# Patient Record
Sex: MALE | Race: BLACK OR AFRICAN AMERICAN | Employment: FULL TIME | URBAN - METROPOLITAN AREA
[De-identification: names, ages, dates, MRNs, and addresses within clinical notes are randomized per-mention and may not be internally consistent; named-entity substitution may affect disease eponyms.]

---

## 2019-08-20 ENCOUNTER — OFFICE VISIT (OUTPATIENT)
Dept: ORTHOPEDIC SURGERY | Age: 44
End: 2019-08-20
Payer: COMMERCIAL

## 2019-08-20 VITALS — BODY MASS INDEX: 41.75 KG/M2 | HEIGHT: 73 IN | WEIGHT: 315 LBS

## 2019-08-20 DIAGNOSIS — M17.11 PRIMARY OSTEOARTHRITIS OF RIGHT KNEE: ICD-10-CM

## 2019-08-20 DIAGNOSIS — M25.561 RIGHT KNEE PAIN, UNSPECIFIED CHRONICITY: Primary | ICD-10-CM

## 2019-08-20 PROCEDURE — G8427 DOCREV CUR MEDS BY ELIG CLIN: HCPCS | Performed by: ORTHOPAEDIC SURGERY

## 2019-08-20 PROCEDURE — 1036F TOBACCO NON-USER: CPT | Performed by: ORTHOPAEDIC SURGERY

## 2019-08-20 PROCEDURE — G8417 CALC BMI ABV UP PARAM F/U: HCPCS | Performed by: ORTHOPAEDIC SURGERY

## 2019-08-20 PROCEDURE — 20610 DRAIN/INJ JOINT/BURSA W/O US: CPT | Performed by: ORTHOPAEDIC SURGERY

## 2019-08-20 PROCEDURE — 99203 OFFICE O/P NEW LOW 30 MIN: CPT | Performed by: ORTHOPAEDIC SURGERY

## 2020-09-01 ENCOUNTER — TELEPHONE (OUTPATIENT)
Dept: ORTHOPEDIC SURGERY | Age: 45
End: 2020-09-01

## 2020-09-01 NOTE — TELEPHONE ENCOUNTER
Called pt. Offered appt for tomorrow for rt knee injury. PT denied & states he will visit ED. All questions answered.

## 2020-09-02 ENCOUNTER — OFFICE VISIT (OUTPATIENT)
Dept: ORTHOPEDIC SURGERY | Age: 45
End: 2020-09-02
Payer: COMMERCIAL

## 2020-09-02 VITALS — HEIGHT: 73 IN | WEIGHT: 315 LBS | BODY MASS INDEX: 41.75 KG/M2

## 2020-09-02 PROCEDURE — G8417 CALC BMI ABV UP PARAM F/U: HCPCS | Performed by: ORTHOPAEDIC SURGERY

## 2020-09-02 PROCEDURE — G8427 DOCREV CUR MEDS BY ELIG CLIN: HCPCS | Performed by: ORTHOPAEDIC SURGERY

## 2020-09-02 PROCEDURE — 1036F TOBACCO NON-USER: CPT | Performed by: ORTHOPAEDIC SURGERY

## 2020-09-02 PROCEDURE — 20610 DRAIN/INJ JOINT/BURSA W/O US: CPT | Performed by: ORTHOPAEDIC SURGERY

## 2020-09-02 PROCEDURE — 99214 OFFICE O/P EST MOD 30 MIN: CPT | Performed by: ORTHOPAEDIC SURGERY

## 2020-09-02 RX ORDER — HYDROCODONE BITARTRATE AND ACETAMINOPHEN 5; 325 MG/1; MG/1
1 TABLET ORAL EVERY 6 HOURS PRN
COMMUNITY
Start: 2020-09-01 | End: 2020-09-03

## 2020-09-02 RX ORDER — METHYLPREDNISOLONE ACETATE 40 MG/ML
80 INJECTION, SUSPENSION INTRA-ARTICULAR; INTRALESIONAL; INTRAMUSCULAR; SOFT TISSUE ONCE
Status: COMPLETED | OUTPATIENT
Start: 2020-09-02 | End: 2020-09-02

## 2020-09-02 RX ORDER — METHOCARBAMOL 750 MG/1
750 TABLET, FILM COATED ORAL EVERY 6 HOURS PRN
COMMUNITY
Start: 2020-09-01

## 2020-09-02 RX ADMIN — METHYLPREDNISOLONE ACETATE 80 MG: 40 INJECTION, SUSPENSION INTRA-ARTICULAR; INTRALESIONAL; INTRAMUSCULAR; SOFT TISSUE at 16:14

## 2020-09-02 NOTE — PROGRESS NOTES
degrees and painful    Strength: He is able to do a straight leg raise    Special Tests: Negative Lockman exam.  No instability to varus and valgus stress testing. Negative posterior drawer    Skin: There are no rashes, ulcerations or lesions. Gait: Antalgic      Additional Comments:       Additional Examinations:         Left Lower Extremity: Examination of the left lower extremity does not show any tenderness, deformity or injury. Range of motion is unremarkable. There is no gross instability. There are no rashes, ulcerations or lesions. Strength and tone are normal.  Right Upper Extremity:  Examination of the right upper extremity does not show any tenderness, deformity or injury. Range of motion is unremarkable. There is no gross instability. There are no rashes, ulcerations or lesions. Strength and tone are normal.  Left Upper Extremity: Examination of the left upper extremity does not show any tenderness, deformity or injury. Range of motion is unremarkable. There is no gross instability. There are no rashes, ulcerations or lesions. Strength and tone are normal.    Radiology:     X-rays obtained and reviewed in office:  Views 4 views of the right knee demonstrates no obvious fracture dislocation or other osseous abnormalities. There is tricompartmental degenerative change with joint space loss and osteophyte formation         Assessment : Right knee osteoarthritis    Impression:  Encounter Diagnoses   Name Primary?  Right knee pain, unspecified chronicity Yes    Primary osteoarthritis of right knee     Effusion of right knee        Office Procedures:  Orders Placed This Encounter   Procedures    XR KNEE RIGHT (MIN 4 VIEWS)     Standing Status:   Future     Number of Occurrences:   1     Standing Expiration Date:   9/2/2021    [id number] - IA DRAIN/INJECT LARGE JOINT/BURSA       Treatment Plan: I discussed the diagnosis and treatment options with him today.   I would recommend today trial of a cortisone injection into the right knee. He is agreeable with this plan. Also recommend continuation of low impact exercise and working on weight loss. I will see him back in clinic in 4 weeks for follow-up if no improvement we could consider Visco supplementation at that time    Under sterile conditions the right knee was aspirated with an 18-gauge needle and a total of 30 cc of normal-appearing joint fluid was aspirated off the knee. Knee was then injected with 2 cc of 40 mg Depo-Medrol mixed with 3 cc of quarter percent Marcaine. He did tolerate the injection well.   Postinjection precautions were given

## 2020-09-30 ENCOUNTER — OFFICE VISIT (OUTPATIENT)
Dept: ORTHOPEDIC SURGERY | Age: 45
End: 2020-09-30
Payer: COMMERCIAL

## 2020-09-30 PROCEDURE — 1036F TOBACCO NON-USER: CPT | Performed by: ORTHOPAEDIC SURGERY

## 2020-09-30 PROCEDURE — 99213 OFFICE O/P EST LOW 20 MIN: CPT | Performed by: ORTHOPAEDIC SURGERY

## 2020-09-30 PROCEDURE — G8417 CALC BMI ABV UP PARAM F/U: HCPCS | Performed by: ORTHOPAEDIC SURGERY

## 2020-09-30 PROCEDURE — G8428 CUR MEDS NOT DOCUMENT: HCPCS | Performed by: ORTHOPAEDIC SURGERY

## 2020-09-30 NOTE — PROGRESS NOTES
Chief Complaint    Knee Problem (RIGHT KNEE )      History of Present Illness:  Slade Velasquez is a 41 y.o. male. He is here today for follow-up for his right knee. We did give him a cortisone injection about a month ago. States he did get some intermittent relief after the cortisone injection but does continue to have a lot of pain within the knee. Does feel like the knee wants to give way on him at times. Does feel like he is getting some locking and catching. Does continue to have a lot of swelling within the knee. Medical History:  Patient's medications, allergies, past medical, surgical, social and family histories were reviewed and updated as appropriate. Review of Systems:  Pertinent items are noted in HPI  Review of systems reviewed from Patient History Form dated on 9/30/20 and available in the patient's chart under the Media tab. Vital Signs: There were no vitals taken for this visit. General Exam:   Constitutional: Patient is adequately groomed with no evidence of malnutrition  DTRs: Deep tendon reflexes are intact  Mental Status: The patient is oriented to time, place and person. The patient's mood and affect are appropriate. Knee Examination:    Inspection: No significant swelling erythema noted but the right knee today     Palpation: There is a mild palpable effusion within the right knee. He does have a lot of tenderness along his medial joint line. There is a palpable plica along the medial femoral condyle     Range of Motion: Extension of the knee 0 degrees with knee flexion today to 120 degrees and painful     Strength: He is able to do a straight leg raise     Special Tests: Negative Lockman exam.  No instability to varus and valgus stress testing. Negative posterior drawer.   He does have pain reproduction with both Apley Rita exam     Skin: There are no rashes, ulcerations or lesions.     Gait: Antalgic    Additional Comments:       Additional Examinations:         Left Lower Extremity: Examination of the left lower extremity does not show any tenderness, deformity or injury. Range of motion is unremarkable. There is no gross instability. There are no rashes, ulcerations or lesions. Strength and tone are normal.        Assessment : Right knee effusion and concern for meniscus tear, patellofemoral arthritis    Impression:  Encounter Diagnosis   Name Primary?  Effusion of right knee Yes       Office Procedures:  No orders of the defined types were placed in this encounter. Treatment Plan: I discussed the diagnosis and treatment options with him today. He got minimal improvement from cortisone injection. I would recommend at this time getting an MRI of the knee to rule out meniscus tear or loose body. He is agreeable with that plan.   We will see him back once MRI is completed

## 2020-10-21 ENCOUNTER — OFFICE VISIT (OUTPATIENT)
Dept: ORTHOPEDIC SURGERY | Age: 45
End: 2020-10-21
Payer: COMMERCIAL

## 2020-10-21 PROCEDURE — 1036F TOBACCO NON-USER: CPT | Performed by: ORTHOPAEDIC SURGERY

## 2020-10-21 PROCEDURE — G8417 CALC BMI ABV UP PARAM F/U: HCPCS | Performed by: ORTHOPAEDIC SURGERY

## 2020-10-21 PROCEDURE — G8427 DOCREV CUR MEDS BY ELIG CLIN: HCPCS | Performed by: ORTHOPAEDIC SURGERY

## 2020-10-21 PROCEDURE — 99213 OFFICE O/P EST LOW 20 MIN: CPT | Performed by: ORTHOPAEDIC SURGERY

## 2020-10-21 PROCEDURE — G8484 FLU IMMUNIZE NO ADMIN: HCPCS | Performed by: ORTHOPAEDIC SURGERY

## 2020-10-21 NOTE — PROGRESS NOTES
Strength and tone are normal.    Radiology:     Narrative    Site: [facility] #: [id number] #: [id number] Procedure: MR Right Knee w/o Contrast ; Reason for Exam: Dx: effusion of right knee, r/o mmt and loose body per script    This document is confidential medical information.  Unauthorized disclosure or use of this information is prohibited by law. If you are not the intended recipient of this document, please advise us by calling immediately [phone].         [facility], [address]              Patient Name: Gayla Adame    Case ID: [id number]    Patient : [id number]    Referring Physician: Hayden Azevedo MD    Exam Date: 10/13/2020    Exam Description: MR Right Knee w/o Contrast              HISTORY:  Pain.  Effusion.         TECHNICAL FACTORS:  Long- and short-axis fat- and water-weighted images were performed.         COMPARISON:  None.         FINDINGS:  Intact extensor mechanism.  Patellofemoral hypertrophic osteoarthropathy.      Intermediate to high grade chondromalacia medial and lateral patellar facets and trochlea.      Laterally tilted and subluxed patella.  No acute dislocation.         Medial meniscus shows myxoid change.  MCL is scarred.  Medial compartment arthropathy.      Osteophytes.  Intermediate grade chondromalacia.         Lateral meniscus and LCL intact.  Lateral compartment arthropathy.  Large osteophytes.         ACL and PCL are intact.  Capsulitis.  No soft tissue mass.         CONCLUSION:    1. Hypertrophic tricompartment osteoarthropathy. Large osteophytes. Intermediate to high grade    chondromalacia most conspicuous lateral patella and trochlea. Mild marrow reaction. 2. Capsulitis. 3. Please see above. Assessment : Right knee osteoarthritis    Impression:  Encounter Diagnosis   Name Primary?     Primary osteoarthritis of right knee Yes       Office Procedures:  Orders Placed This Encounter Procedures    SYNVISC OR SYNVISC-ONE INJECTION (For Auth/Precert)     Right knee     Standing Status:   Future     Standing Expiration Date:   10/21/2021       Treatment Plan: I discussed the diagnosis and treatment options with him today. He does have significant osteoarthritis within the right knee. I do believe that this is what is causing the majority of his symptoms. There is no evidence of a meniscal flap or loose body that would be causing any pain within his knee. I would recommend at this time getting him approved for series of Visco injections. He is agreeable with that plan.   We will see him back once he is approved for his injections

## 2020-10-22 ENCOUNTER — TELEPHONE (OUTPATIENT)
Dept: ORTHOPEDIC SURGERY | Age: 45
End: 2020-10-22

## 2020-11-04 ENCOUNTER — OFFICE VISIT (OUTPATIENT)
Dept: ORTHOPEDIC SURGERY | Age: 45
End: 2020-11-04
Payer: COMMERCIAL

## 2020-11-04 PROCEDURE — 20610 DRAIN/INJ JOINT/BURSA W/O US: CPT | Performed by: ORTHOPAEDIC SURGERY

## 2020-11-04 NOTE — PROGRESS NOTES
DIAGNOSIS: Osteoarthritis, Chondromalacia in the right knee. HISTORY OF PRESENT ILLNESS: The patient is here for the synvisc one injection into the right knee. PROCEDURE: Under sterile conditions, the patient was injected in the superolateral pouch of the right knee with synvisc one prefilled syringe with a 22-gauge needle. The injection was tolerated well. Post-injection precautions were given. PLAN: The patient will follow up with us in clinic in three months to check to see how they responded to the injections.

## 2021-03-22 ENCOUNTER — TELEPHONE (OUTPATIENT)
Dept: ORTHOPEDIC SURGERY | Age: 46
End: 2021-03-22

## 2021-03-22 NOTE — TELEPHONE ENCOUNTER
FAXED [facility] ( [facility] ) --ALL-- TO DR. YANEZ @ [place] @ [phone]

## 2025-08-04 ENCOUNTER — TELEPHONE (OUTPATIENT)
Dept: CARDIOLOGY CLINIC | Age: 50
End: 2025-08-04